# Patient Record
Sex: FEMALE | URBAN - METROPOLITAN AREA
[De-identification: names, ages, dates, MRNs, and addresses within clinical notes are randomized per-mention and may not be internally consistent; named-entity substitution may affect disease eponyms.]

---

## 2017-04-11 ENCOUNTER — APPOINTMENT (RX ONLY)
Dept: URBAN - METROPOLITAN AREA CLINIC 77 | Facility: CLINIC | Age: 57
Setting detail: DERMATOLOGY
End: 2017-04-11

## 2017-04-11 DIAGNOSIS — L64.8 OTHER ANDROGENIC ALOPECIA: ICD-10-CM

## 2017-04-11 DIAGNOSIS — L85.3 XEROSIS CUTIS: ICD-10-CM

## 2017-04-11 PROBLEM — L65.9 NONSCARRING HAIR LOSS, UNSPECIFIED: Status: ACTIVE | Noted: 2017-04-11

## 2017-04-11 PROCEDURE — ? BIOPSY BY PUNCH METHOD

## 2017-04-11 PROCEDURE — 11100: CPT

## 2017-04-11 PROCEDURE — 99203 OFFICE O/P NEW LOW 30 MIN: CPT | Mod: 25

## 2017-04-11 PROCEDURE — ? TREATMENT REGIMEN

## 2017-04-11 PROCEDURE — ? COUNSELING

## 2017-04-11 ASSESSMENT — LOCATION ZONE DERM: LOCATION ZONE: SCALP

## 2017-04-11 ASSESSMENT — LOCATION DETAILED DESCRIPTION DERM: LOCATION DETAILED: LEFT CENTRAL FRONTAL SCALP

## 2017-04-11 ASSESSMENT — LOCATION SIMPLE DESCRIPTION DERM: LOCATION SIMPLE: LEFT SCALP

## 2017-04-11 NOTE — PROCEDURE: BIOPSY BY PUNCH METHOD
Size Of Lesion In Cm (Optional): 0
Post-Care Instructions: I reviewed with the patient in detail post-care instructions. Patient is to keep the biopsy site dry overnight, and then apply bacitracin twice daily until healed. Patient may apply hydrogen peroxide soaks to remove any crusting.
Biopsy Type: H and E
Epidermal Sutures: 5-0 Ethilon
Lab: [id number]
Punch Size In Mm: 3
Patient Will Remove Sutures At Home?: No
Anesthesia Type: 1% lidocaine with epinephrine
Home Suture Removal Text: Patient was provided a home suture removal kit and will remove their sutures at home.  If they have any questions or difficulties they will call the office.
Hemostasis: None
Dressing: bandage
Wound Care: Bacitracin
Suture Removal: 14 days
Lab Facility: [id number]
Notification Instructions: Patient will be notified of biopsy results. However, patient instructed to call the office if not contacted within 2 weeks.
Consent: Written consent was obtained and risks were reviewed including but not limited to scarring, infection, bleeding, scabbing, incomplete removal, nerve damage and allergy to anesthesia.
Body Location Override (Optional - Billing Will Still Be Based On Selected Body Map Location If Applicable): Scalp
Deep Sutures: 5-0 Vicryl
Anesthesia Volume In Cc (Will Not Render If 0): 0.5
Billing Type: Third-Party Bill
Detail Level: Detailed

## 2017-04-11 NOTE — PROCEDURE: TREATMENT REGIMEN
Plan: Pt has diffuse thinning hair on her scalp.\\nPt states that it has gradually been falling out, but recently, more of it has fallen out quicker.\\nPt states that she has been to many doctors and feels like they really have not looked into the problem.\\nPt states that blood work was recently done and found out that she has hypothyroidism,\\nSo currently her only treatment is taking iron supplements, little to no improvement.\\nPt states she is also using Rogaine as well.\\nDiscussed with pt since she has not had a punch bx, will perform punch bx today to get definitive diagnosis.\\nDiscussed with pt that once bx report returns, will treat as needed.\\nF/u in 2 weeks
Detail Level: Zone

## 2017-04-25 ENCOUNTER — APPOINTMENT (RX ONLY)
Dept: URBAN - METROPOLITAN AREA CLINIC 77 | Facility: CLINIC | Age: 57
Setting detail: DERMATOLOGY
End: 2017-04-25

## 2017-04-25 DIAGNOSIS — L64.8 OTHER ANDROGENIC ALOPECIA: ICD-10-CM

## 2017-04-25 DIAGNOSIS — Z48.02 ENCOUNTER FOR REMOVAL OF SUTURES: ICD-10-CM

## 2017-04-25 PROCEDURE — ? PRESCRIPTION

## 2017-04-25 PROCEDURE — ? TREATMENT REGIMEN

## 2017-04-25 PROCEDURE — ? SUTURE REMOVAL (GLOBAL PERIOD)

## 2017-04-25 PROCEDURE — 99213 OFFICE O/P EST LOW 20 MIN: CPT

## 2017-04-25 PROCEDURE — ? COUNSELING

## 2017-04-25 RX ORDER — SPIRONOLACTONE 50 MG/1
TABLET, FILM COATED ORAL
Qty: 50 | Refills: 3 | Status: ERX | COMMUNITY
Start: 2017-04-25

## 2017-04-25 RX ADMIN — SPIRONOLACTONE: 50 TABLET, FILM COATED ORAL at 22:12

## 2017-04-25 ASSESSMENT — LOCATION SIMPLE DESCRIPTION DERM: LOCATION SIMPLE: LEFT SCALP

## 2017-04-25 ASSESSMENT — LOCATION ZONE DERM: LOCATION ZONE: SCALP

## 2017-04-25 ASSESSMENT — LOCATION DETAILED DESCRIPTION DERM: LOCATION DETAILED: LEFT CENTRAL FRONTAL SCALP

## 2017-04-25 NOTE — PROCEDURE: SUTURE REMOVAL (GLOBAL PERIOD)
Add 92036 Cpt? (Important Note: In 2017 The Use Of 72550 Is Being Tracked By Cms To Determine Future Global Period Reimbursement For Global Periods): no
Detail Level: Detailed

## 2017-04-25 NOTE — PROCEDURE: TREATMENT REGIMEN
Plan: Advised Pt to start Rogain 5% daily and spironolactone 50 mg\\nWe will monitor progression of hair growth with low dose spironolactone 50 mg ( Pt has low blood pressure), we plan to increase dosage to 100 mg daily along side with starting finasteride. \\nTHIS LOOKS TO BE A LONG TERM ISSUE THAT WE WANT TO GET UNDER CONTROL WITH HORMONE BLOCKING THROUGH SPIRONOLACTONE AND POSSIBLY FINASTERIDE 1MG DAILY\\nOnce she is stable our plan will be to likely refer to a surgeon capable of hair transplant\\Deidra advised to look into hair transplant surgeon
Detail Level: Zone

## 2017-07-17 ENCOUNTER — APPOINTMENT (RX ONLY)
Dept: URBAN - METROPOLITAN AREA CLINIC 77 | Facility: CLINIC | Age: 57
Setting detail: DERMATOLOGY
End: 2017-07-17

## 2017-07-17 DIAGNOSIS — L57.0 ACTINIC KERATOSIS: ICD-10-CM

## 2017-07-17 DIAGNOSIS — D485 NEOPLASM OF UNCERTAIN BEHAVIOR OF SKIN: ICD-10-CM

## 2017-07-17 DIAGNOSIS — B07.0 PLANTAR WART: ICD-10-CM

## 2017-07-17 DIAGNOSIS — Z80.8 FAMILY HISTORY OF MALIGNANT NEOPLASM OF OTHER ORGANS OR SYSTEMS: ICD-10-CM

## 2017-07-17 DIAGNOSIS — D22 MELANOCYTIC NEVI: ICD-10-CM

## 2017-07-17 PROBLEM — D48.5 NEOPLASM OF UNCERTAIN BEHAVIOR OF SKIN: Status: ACTIVE | Noted: 2017-07-17

## 2017-07-17 PROBLEM — D22.72 MELANOCYTIC NEVI OF LEFT LOWER LIMB, INCLUDING HIP: Status: ACTIVE | Noted: 2017-07-17

## 2017-07-17 PROCEDURE — ? BENIGN DESTRUCTION

## 2017-07-17 PROCEDURE — 11100: CPT | Mod: 59

## 2017-07-17 PROCEDURE — ? BIOPSY BY SHAVE METHOD

## 2017-07-17 PROCEDURE — ? COUNSELING

## 2017-07-17 PROCEDURE — ? OBSERVATION AND MEASURE

## 2017-07-17 PROCEDURE — ? PHOTODYNAMIC THERAPY COUNSELING

## 2017-07-17 PROCEDURE — 17110 DESTRUCTION B9 LES UP TO 14: CPT

## 2017-07-17 PROCEDURE — 99213 OFFICE O/P EST LOW 20 MIN: CPT | Mod: 25

## 2017-07-17 ASSESSMENT — LOCATION ZONE DERM
LOCATION ZONE: LEG
LOCATION ZONE: FACE
LOCATION ZONE: FEET
LOCATION ZONE: ARM

## 2017-07-17 ASSESSMENT — LOCATION DETAILED DESCRIPTION DERM
LOCATION DETAILED: LEFT POSTERIOR SHOULDER
LOCATION DETAILED: LEFT INFERIOR CENTRAL MALAR CHEEK
LOCATION DETAILED: INFERIOR MID FOREHEAD
LOCATION DETAILED: LEFT LATERAL PROXIMAL CALF
LOCATION DETAILED: RIGHT INFERIOR CENTRAL MALAR CHEEK
LOCATION DETAILED: RIGHT PLANTAR FOREFOOT OVERLYING 2ND METATARSAL

## 2017-07-17 ASSESSMENT — LOCATION SIMPLE DESCRIPTION DERM
LOCATION SIMPLE: INFERIOR FOREHEAD
LOCATION SIMPLE: LEFT LOWER LEG
LOCATION SIMPLE: RIGHT PLANTAR SURFACE
LOCATION SIMPLE: RIGHT CHEEK
LOCATION SIMPLE: LEFT SHOULDER
LOCATION SIMPLE: LEFT CHEEK

## 2017-07-17 NOTE — PROCEDURE: BENIGN DESTRUCTION
Medical Necessity Information: It is in your best interest to select a reason for this procedure from the list below. All of these items fulfill various CMS LCD requirements except the new and changing color options.
Treatment Number (Will Not Render If 0): 0
Detail Level: Detailed
Include Z78.9 (Other Specified Conditions Influencing Health Status) As An Associated Diagnosis?: No
Post-Care Instructions: I reviewed with the patient in detail post-care instructions. Patient is to wear sunprotection, and avoid picking at any of the treated lesions. Pt may apply Vaseline to crusted or scabbing areas.
Anesthesia Volume In Cc: 0.5
Medical Necessity Clause: This procedure was medically necessary because the lesions that were treated were:
Consent: The patient's consent was obtained including but not limited to risks of crusting, scabbing, blistering, scarring, darker or lighter pigmentary change, recurrence, incomplete removal and infection.

## 2017-07-17 NOTE — PROCEDURE: OBSERVATION
Detail Level: Detailed
Morphology Per Location (Optional): Brown segmented patch
Size Of Lesion: 6 x 3 in

## 2017-07-17 NOTE — PROCEDURE: BIOPSY BY SHAVE METHOD
Wound Care: Bacitracin
Dressing: bandage
Lab: [id number]
Additional Anesthesia Volume In Cc (Will Not Render If 0): 0
Type Of Destruction Used: Curettage
Destruction After The Procedure: No
Billing Type: Third-Party Bill
Curettage Text: The wound bed was treated with curettage after the biopsy was performed.
Biopsy Type: H and E
Electrodesiccation And Curettage Text: The wound bed was treated with electrodesiccation and curettage after the biopsy was performed.
Biopsy Method: Dermablade
Consent: Written consent was obtained and risks were reviewed including but not limited to scarring, infection, bleeding, scabbing, incomplete removal, nerve damage and allergy to anesthesia.
Silver Nitrate Text: The wound bed was treated with silver nitrate after the biopsy was performed.
Electrodesiccation Text: The wound bed was treated with electrodesiccation after the biopsy was performed.
Cryotherapy Text: The wound bed was treated with cryotherapy after the biopsy was performed.
Lab Facility: [id number]
Notification Instructions: Patient will be notified of biopsy results. However, patient instructed to call the office if not contacted within 2 weeks.
Anesthesia Volume In Cc (Will Not Render If 0): 0.5
Post-Care Instructions: I reviewed with the patient in detail post-care instructions. Patient is to keep the biopsy site dry overnight, and then apply bacitracin twice daily until healed. Patient may apply hydrogen peroxide soaks to remove any crusting.
Hemostasis: Drysol
Detail Level: Detailed
Anesthesia Type: 1% lidocaine with epinephrine

## 2017-10-27 ENCOUNTER — RX ONLY (OUTPATIENT)
Age: 57
Setting detail: RX ONLY
End: 2017-10-27

## 2017-10-27 RX ORDER — SPIRONOLACTONE 50 MG/1
TABLET, FILM COATED ORAL
Qty: 50 | Refills: 11 | Status: ERX

## 2018-11-05 ENCOUNTER — APPOINTMENT (RX ONLY)
Dept: URBAN - METROPOLITAN AREA CLINIC 77 | Facility: CLINIC | Age: 58
Setting detail: DERMATOLOGY
End: 2018-11-05

## 2018-11-05 DIAGNOSIS — L21.8 OTHER SEBORRHEIC DERMATITIS: ICD-10-CM

## 2018-11-05 DIAGNOSIS — L91.8 OTHER HYPERTROPHIC DISORDERS OF THE SKIN: ICD-10-CM

## 2018-11-05 DIAGNOSIS — L64.8 OTHER ANDROGENIC ALOPECIA: ICD-10-CM

## 2018-11-05 DIAGNOSIS — L57.0 ACTINIC KERATOSIS: ICD-10-CM

## 2018-11-05 DIAGNOSIS — D22 MELANOCYTIC NEVI: ICD-10-CM

## 2018-11-05 DIAGNOSIS — M71 OTHER BURSOPATHIES: ICD-10-CM

## 2018-11-05 DIAGNOSIS — Z80.8 FAMILY HISTORY OF MALIGNANT NEOPLASM OF OTHER ORGANS OR SYSTEMS: ICD-10-CM

## 2018-11-05 DIAGNOSIS — B07.8 OTHER VIRAL WARTS: ICD-10-CM

## 2018-11-05 DIAGNOSIS — L82.0 INFLAMED SEBORRHEIC KERATOSIS: ICD-10-CM

## 2018-11-05 PROBLEM — D22.72 MELANOCYTIC NEVI OF LEFT LOWER LIMB, INCLUDING HIP: Status: ACTIVE | Noted: 2018-11-05

## 2018-11-05 PROBLEM — M71.341 OTHER BURSAL CYST, RIGHT HAND: Status: ACTIVE | Noted: 2018-11-05

## 2018-11-05 PROCEDURE — 17000 DESTRUCT PREMALG LESION: CPT | Mod: 59

## 2018-11-05 PROCEDURE — ? PRESCRIPTION

## 2018-11-05 PROCEDURE — 17110 DESTRUCTION B9 LES UP TO 14: CPT

## 2018-11-05 PROCEDURE — ? OBSERVATION AND MEASURE

## 2018-11-05 PROCEDURE — ? INTRALESIONAL KENALOG

## 2018-11-05 PROCEDURE — 99213 OFFICE O/P EST LOW 20 MIN: CPT | Mod: 25

## 2018-11-05 PROCEDURE — ? LIQUID NITROGEN

## 2018-11-05 PROCEDURE — ? BENIGN DESTRUCTION

## 2018-11-05 PROCEDURE — ? PHOTODYNAMIC THERAPY COUNSELING

## 2018-11-05 PROCEDURE — ? COUNSELING

## 2018-11-05 PROCEDURE — ? TREATMENT REGIMEN

## 2018-11-05 RX ORDER — CLOBETASOL PROPIONATE 0.5 MG/ML
SHAMPOO TOPICAL
Qty: 1 | Refills: 6 | Status: ERX | COMMUNITY
Start: 2018-11-05

## 2018-11-05 RX ADMIN — CLOBETASOL PROPIONATE: 0.5 SHAMPOO TOPICAL at 16:19

## 2018-11-05 ASSESSMENT — LOCATION SIMPLE DESCRIPTION DERM
LOCATION SIMPLE: RIGHT EAR
LOCATION SIMPLE: RIGHT INDEX FINGER
LOCATION SIMPLE: RIGHT PLANTAR SURFACE
LOCATION SIMPLE: INFERIOR FOREHEAD
LOCATION SIMPLE: RIGHT CHEEK
LOCATION SIMPLE: LEFT LOWER LEG
LOCATION SIMPLE: LEFT UPPER ARM
LOCATION SIMPLE: NOSE
LOCATION SIMPLE: RIGHT HAND
LOCATION SIMPLE: LEFT CHEEK

## 2018-11-05 ASSESSMENT — LOCATION DETAILED DESCRIPTION DERM
LOCATION DETAILED: RIGHT CYMBA CONCHA
LOCATION DETAILED: LEFT ANTERIOR PROXIMAL UPPER ARM
LOCATION DETAILED: LEFT INFERIOR CENTRAL MALAR CHEEK
LOCATION DETAILED: RIGHT INFERIOR CENTRAL MALAR CHEEK
LOCATION DETAILED: RIGHT RADIAL DORSAL HAND
LOCATION DETAILED: NASAL DORSUM
LOCATION DETAILED: RIGHT PLANTAR FOREFOOT OVERLYING 2ND METATARSAL
LOCATION DETAILED: LEFT LATERAL PROXIMAL CALF
LOCATION DETAILED: RIGHT DORSAL MIDDLE FINGER METACARPOPHALANGEAL JOINT
LOCATION DETAILED: INFERIOR MID FOREHEAD
LOCATION DETAILED: RIGHT DISTAL DORSAL INDEX FINGER

## 2018-11-05 ASSESSMENT — LOCATION ZONE DERM
LOCATION ZONE: FACE
LOCATION ZONE: FINGER
LOCATION ZONE: NOSE
LOCATION ZONE: EAR
LOCATION ZONE: HAND
LOCATION ZONE: LEG
LOCATION ZONE: FEET
LOCATION ZONE: ARM

## 2018-11-05 NOTE — PROCEDURE: OBSERVATION
Detail Level: Detailed
Size Of Lesion: 6 x 3 in
Morphology Per Location (Optional): Brown segmented patch

## 2018-11-05 NOTE — PROCEDURE: INTRALESIONAL KENALOG
Consent: The risks of atrophy were reviewed with the patient.
Kenalog Preparation: Kenalog with 5-fluorouracil
Include Z78.9 (Other Specified Conditions Influencing Health Status) As An Associated Diagnosis?: No
Detail Level: Zone
Total Volume Injected (Ccs- Only Use Numbers And Decimals): .8
X Size Of Lesion In Cm (Optional): 0
Medical Necessity Clause: This procedure was medically necessary because the lesions that were treated were:
Concentration Of Solution Injected (Mg/Ml): 40.0

## 2018-11-05 NOTE — PROCEDURE: BENIGN DESTRUCTION
Anesthesia Volume In Cc: 0.5
Include Z78.9 (Other Specified Conditions Influencing Health Status) As An Associated Diagnosis?: No
Detail Level: Detailed
Medical Necessity Clause: This procedure was medically necessary because the lesions that were treated were:
Medical Necessity Information: It is in your best interest to select a reason for this procedure from the list below. All of these items fulfill various CMS LCD requirements except the new and changing color options.
Consent: The patient's consent was obtained including but not limited to risks of crusting, scabbing, blistering, scarring, darker or lighter pigmentary change, recurrence, incomplete removal and infection.
Post-Care Instructions: I reviewed with the patient in detail post-care instructions. Patient is to wear sunprotection, and avoid picking at any of the treated lesions. Pt may apply Vaseline to crusted or scabbing areas.

## 2018-11-05 NOTE — PROCEDURE: LIQUID NITROGEN
Number Of Freeze-Thaw Cycles: 3 freeze-thaw cycles
Medical Necessity Information: It is in your best interest to select a reason for this procedure from the list below. All of these items fulfill various CMS LCD requirements except the new and changing color options.
Consent: The patient's consent was obtained including but not limited to risks of crusting, scabbing, blistering, scarring, darker or lighter pigmentary change, recurrence, incomplete removal and infection.
Medical Necessity Clause: This procedure was medically necessary because the lesions that were treated were:
Post-Care Instructions: I reviewed with the patient in detail post-care instructions. Patient is to wear sunprotection, and avoid picking at any of the treated lesions. Pt may apply Vaseline to crusted or scabbing areas.
Include Z78.9 (Other Specified Conditions Influencing Health Status) As An Associated Diagnosis?: No
Detail Level: Detailed
Number Of Freeze-Thaw Cycles: 2 freeze-thaw cycles
Duration Of Freeze Thaw-Cycle (Seconds): 5
Detail Level: Generalized

## 2018-11-05 NOTE — PROCEDURE: TREATMENT REGIMEN
Detail Level: Zone
Plan: Advised Pt to start Rogain 5% daily and spironolactone 50 mg\\nWe will monitor progression of hair growth with low dose spironolactone 50 mg ( Pt has low blood pressure), we plan to increase dosage to 100 mg daily along side with starting finasteride. \\nTHIS LOOKS TO BE A LONG TERM ISSUE THAT WE WANT TO GET UNDER CONTROL WITH HORMONE BLOCKING THROUGH SPIRONOLACTONE AND POSSIBLY FINASTERIDE 1MG DAILY\\nOnce she is stable our plan will be to likely refer to a surgeon capable of hair transplant\\Deidra advised to look into hair transplant surgeon
Plan: Location: right foot\\nTreatment: LN2-1x\\n\\nDiscussed with patient that they are a common HPV viral infection that occurs during stress and immune system is unable to fight the virus.\\nWill treat with cryotherapy today to allow immune system to fight off virus. \\nDiscussed with pt if wart blister ups, then can use sterile needle to poke and let fluid drain.\\nDiscussed with pt that they should leave skin attached to help provide a protective barrier while it is healing.\\n\\nDiscussed with pt if it does not disappear, then we my have to treat with Cidofovir anti viral injection at future appointment.\\nAlthough it is not FDA approved, is very effective. \\nDiscussed anticipated side effects of Cidofovir treatment that may include discomfort, burning, swelling, redness, and blistering.\\nF/u in 4 weeks
Plan: Advised pt to do PDT for face in the Fall
Detail Level: Generalized
Plan: Location:\\nPrescribe: AVAR Wash (3-5 minutes) qd x 30 days\\n                Elidel Cream top qd x 30 days\\n\\nDiscussed with patient that this irritation, dry scaly rash is an immune mediated condition that can be triggered or exacerbated by several factors such as lack of sleep, stress, allergies, or illness. \\nThere is also a genetic component which we discussed can be helped by taking good care of the skin with a barrier cream and avoiding harsh products.\\n\\nWill prescribe AVAR wash LS Cleanser (5 minutes), Elidel cream x 30 days to help relieve inflammation.\\nRecommend taking Allegra 180mg daily to decrease histamine level and eliminate this trigger.\\nF/u as needed.

## 2018-11-07 ENCOUNTER — RX ONLY (OUTPATIENT)
Age: 58
Setting detail: RX ONLY
End: 2018-11-07

## 2018-11-07 RX ORDER — CLOBETASOL PROPIONATE 0.5 MG/ML
SHAMPOO TOPICAL
Qty: 1 | Refills: 6 | Status: ERX

## 2020-01-07 ENCOUNTER — RX ONLY (OUTPATIENT)
Age: 60
Setting detail: RX ONLY
End: 2020-01-07

## 2020-02-03 ENCOUNTER — APPOINTMENT (RX ONLY)
Dept: URBAN - METROPOLITAN AREA CLINIC 77 | Facility: CLINIC | Age: 60
Setting detail: DERMATOLOGY
End: 2020-02-03

## 2020-02-03 DIAGNOSIS — L64.8 OTHER ANDROGENIC ALOPECIA: ICD-10-CM

## 2020-02-03 DIAGNOSIS — L72.0 EPIDERMAL CYST: ICD-10-CM

## 2020-02-03 DIAGNOSIS — B07.8 OTHER VIRAL WARTS: ICD-10-CM

## 2020-02-03 DIAGNOSIS — L85.3 XEROSIS CUTIS: ICD-10-CM

## 2020-02-03 PROCEDURE — 99213 OFFICE O/P EST LOW 20 MIN: CPT | Mod: 25

## 2020-02-03 PROCEDURE — ? PRESCRIPTION

## 2020-02-03 PROCEDURE — ? COUNSELING

## 2020-02-03 PROCEDURE — 11900 INJECT SKIN LESIONS </W 7: CPT

## 2020-02-03 PROCEDURE — ? TREATMENT REGIMEN

## 2020-02-03 PROCEDURE — ? INJECTION

## 2020-02-03 ASSESSMENT — LOCATION SIMPLE DESCRIPTION DERM
LOCATION SIMPLE: RIGHT PLANTAR SURFACE
LOCATION SIMPLE: LEFT HAND
LOCATION SIMPLE: RIGHT HAND
LOCATION SIMPLE: RIGHT CHEEK

## 2020-02-03 ASSESSMENT — LOCATION ZONE DERM
LOCATION ZONE: HAND
LOCATION ZONE: FEET
LOCATION ZONE: FACE

## 2020-02-03 ASSESSMENT — LOCATION DETAILED DESCRIPTION DERM
LOCATION DETAILED: RIGHT PLANTAR FOREFOOT OVERLYING 4TH METATARSAL
LOCATION DETAILED: RIGHT MEDIAL MALAR CHEEK
LOCATION DETAILED: RIGHT SUPERIOR MEDIAL MALAR CHEEK
LOCATION DETAILED: RIGHT DORSAL MIDDLE METACARPOPHALANGEAL JOINT
LOCATION DETAILED: LEFT ULNAR DORSAL HAND

## 2020-02-03 NOTE — PROCEDURE: TREATMENT REGIMEN
Detail Level: Zone
Plan: Location: Scalp\\nPh[facility]acy: [facility] [address] \\nTaking: Spironolactone 100mg tablet \\nNEW PRESCRIPTION: Finasteride 5mg tablet \\n\\n2-3-2020 \\n\\nPatient is here for a follow up \\nPatient states she is currently taking Spironolactone once a day with no side effects \\nPatient is also using Men’s Rogaine, she mentions she did notice some hair loss recently but could have been related to her being under some stress due to work \\n\\nDiscussed with patient that androgenetic alopecia is a condition triggered by stress but also has a genetic component \\nPatient states she is comfortable on treatment regimen however if there is something else she can add on to her treatment regimen to help maintain her hair she would like to try it \\n\\nPLAN: \\nToday i will have patient continue on treatment regimen she is currently on --- taking spironolactone once a day and using men's rogaine \\nI will be adding Finasteride 5mg patient is to take 1/2 tablet daily discussed with patient possible side effects if any she would probably experience low sex drive however are soon as she stops medication that will resolve \\n--- Discussed with patient that if she begins to notice any side effects other than low sex drive she is to call office and inform staff to adjust medication \\n\\nFollow up: 5-6 months
Plan: Location: Right cheek \\n\\nPatient presents with a milium located on the right cheek \\nPatient states she finds lesion bothersome and wants it gone \\n\\nDiscussed with patient that lesion is completely benign \\nToday I will remove lesion with an 11 blade and cotton tip applicators
Plan: Location: Right plantar foot \\nTreatment: Cidofovir injection \\n\\nPatient presents with a wart located on the right plantar foot \\nLesion has been treated in the past with liquid nitrogen \\nPatient states every time after each treatment wart will only pulse but never resolve \\n\\nDiscussed with patient that they are a common HPV viral infection that occurs during stress and immune system is unable to fight the virus.\\n— Discussed with patient that since wart has not responded well to liquid nitrogen we will treat with Cidofovir today \\n\\nWHEN TREATING WITH CIDOFOVIR, I PERFORM 20-50 MICROINJECTIONS OF THE ANTIVIRAL MEDICATION INTO THE WART ONCE I HAVE PARED IT DOWN-----THIS MEDICATION THEN KILLS THE VIRALLY ACTIVE CELLS AND THE IMMUNE SYSTEM IS BROUGHT TO THE SURFACE TO CLEAR THE WART AND THE DESTROYED DEBRIS.\\nTHIS MEDICATION WORKS ON RESILIANT WARTS THAT I HAVE NOT BEEN ABLE TO CLEAR OTHERWISE\\n— If patient finds herself with discomfort then she is to call office and schedule appointment \\n\\n\\nFollow up: 5-6 weeks

## 2020-02-03 NOTE — PROCEDURE: INJECTION
Dose Administered (Numbers Only): 0
Medication (1) And Associated J-Code Units: Cidofovir, 375 mg
Hide Second Medication?: No
Post-Care Instructions: I reviewed with the patient in detail post-care instructions. Patient understands to keep the injection sites clean and call the clinic if there is any redness, swelling or pain.
Detail Level: None
Total Volume Injected In Cc (Will Not Affected Billing): 0.2
Route: IL
Render J-Code Information In Note?: yes
Procedure Information: Please note that the numeric value listed in the Medication (1) and associated J-code units and Medication (2) and associated J-code units variables are j-code amounts and do not represent either the concentration or the total amount of the medications injected.  I strongly recommend selecting no to the Render J-code information in note question. This will allow your note to be more clear. If you are billing j-codes with your injection codes you need to document the total amount of the medication injected. This amount should match the j-code units. For example, if you are injecting Triamcinolone 40mg as an intramuscular injection you would select 40 for the dose field and mg for the units. This would allow you to document  with 4 units (40mg = 10mg x 4). The total volume is not used to calculate j-codes only the amount of the medication administered.
Administered By (Optional): Dr. Subramanian
Consent: The risks of the medication were reviewed with the patient.

## 2020-02-27 ENCOUNTER — RX ONLY (OUTPATIENT)
Age: 60
Setting detail: RX ONLY
End: 2020-02-27

## 2020-03-09 ENCOUNTER — APPOINTMENT (RX ONLY)
Dept: URBAN - METROPOLITAN AREA CLINIC 77 | Facility: CLINIC | Age: 60
Setting detail: DERMATOLOGY
End: 2020-03-09

## 2020-03-09 DIAGNOSIS — L85.3 XEROSIS CUTIS: ICD-10-CM

## 2020-03-09 DIAGNOSIS — B07.8 OTHER VIRAL WARTS: ICD-10-CM

## 2020-03-09 DIAGNOSIS — B35.1 TINEA UNGUIUM: ICD-10-CM

## 2020-03-09 PROCEDURE — ? LIQUID NITROGEN

## 2020-03-09 PROCEDURE — ? TREATMENT REGIMEN

## 2020-03-09 PROCEDURE — 99213 OFFICE O/P EST LOW 20 MIN: CPT | Mod: 25

## 2020-03-09 PROCEDURE — ? COUNSELING

## 2020-03-09 PROCEDURE — ? PRESCRIPTION

## 2020-03-09 PROCEDURE — 17110 DESTRUCTION B9 LES UP TO 14: CPT

## 2020-03-09 RX ORDER — TAVABOROLE 43.5 MG/ML
SOLUTION TOPICAL
Qty: 1 | Refills: 3 | Status: ERX | COMMUNITY
Start: 2020-03-09

## 2020-03-09 RX ORDER — FLUCONAZOLE 100 MG/1
TABLET ORAL
Qty: 36 | Refills: 0 | Status: ERX | COMMUNITY
Start: 2020-03-09

## 2020-03-09 RX ADMIN — TAVABOROLE: 43.5 SOLUTION TOPICAL at 00:00

## 2020-03-09 RX ADMIN — FLUCONAZOLE: 100 TABLET ORAL at 00:00

## 2020-03-09 ASSESSMENT — LOCATION SIMPLE DESCRIPTION DERM
LOCATION SIMPLE: UPPER BACK
LOCATION SIMPLE: RIGHT PLANTAR SURFACE

## 2020-03-09 ASSESSMENT — LOCATION ZONE DERM
LOCATION ZONE: TRUNK
LOCATION ZONE: FEET

## 2020-03-09 ASSESSMENT — LOCATION DETAILED DESCRIPTION DERM
LOCATION DETAILED: RIGHT PLANTAR FOREFOOT OVERLYING 1ST METATARSAL
LOCATION DETAILED: SUPERIOR THORACIC SPINE
LOCATION DETAILED: RIGHT PLANTAR FOREFOOT OVERLYING 4TH METATARSAL

## 2020-03-09 NOTE — PROCEDURE: TREATMENT REGIMEN
Detail Level: Zone
Plan: Location: Right plantar foot \\nPrevious treatment: Cidofovir injection 02/03/2020\\nTreatment: LN2 03/09/2020\\n\\nPatient is here for a 4 week follow up\\nPnataly has previously gotten a Cidofovir injection on 02/03/2020 and she states injection did help to eliminate plantar wart, however wart has not fully gone away.\\nToday I will treat with LN2 treatment and see how well the patient responds since getting Cidofovir injection. \\nPatient is to monitor plantar wart and follow up if more treatments are required.\\n\\nPatient is to follow up as needed. \\n-------------------------------------------------------------------------------\\nPatient presents with a wart located on the right plantar foot \\nLesion has been treated in the past with liquid nitrogen \\nPatient states every time after each treatment wart will only pulse but never resolve \\n\\nDiscussed with patient that they are a common HPV viral infection that occurs during stress and immune system is unable to fight the virus.\\n— Discussed with patient that since wart has not responded well to liquid nitrogen we will treat with Cidofovir today \\n\\nWHEN TREATING WITH CIDOFOVIR, I PERFORM 20-50 MICROINJECTIONS OF THE ANTIVIRAL MEDICATION INTO THE WART ONCE I HAVE PARED IT DOWN-----THIS MEDICATION THEN KILLS THE VIRALLY ACTIVE CELLS AND THE IMMUNE SYSTEM IS BROUGHT TO THE SURFACE TO CLEAR THE WART AND THE DESTROYED DEBRIS.\\nTHIS MEDICATION WORKS ON RESILIANT WARTS THAT I HAVE NOT BEEN ABLE TO CLEAR OTHERWISE\\n— If patient finds herself with discomfort then she is to call office and schedule appointment \\n\\n\\nFollow up: 5-6 weeks
Plan: Location: Feet\\nPrescribe: Fluconazole 100mg (Take 3 tablets once a day, weekly for 3 months)\\n[facility] \\n\\nDiscussed with patient that he has severe fungal infection.\\nEducated patient that fungal infection can grow after having trauma, humid environment, or when theres an opening in the nail bed\\n\\nDiscussed with pt that we will prescribe Fluconazole 100mg (Take 3 tablets once a day, weekly for 3 months) --pulse dosing-- to help fight fungal infection.\\nDiscussed with patient that nail bed typically takes 9-12 months to completely grow out fungal infection.\\nOnce patient completes oral treatment, we will start patient on topical treatment such as Kerydin. \\nPatient is to apply Kerydin daily to help stimulate the nail growth.\\nPatient is to continue current regimen and follow up as instructed.\\n\\n\\nPatient is to follow up in 3 months

## 2020-05-20 ENCOUNTER — RX ONLY (OUTPATIENT)
Age: 60
Setting detail: RX ONLY
End: 2020-05-20

## 2020-06-08 ENCOUNTER — APPOINTMENT (RX ONLY)
Dept: URBAN - METROPOLITAN AREA CLINIC 77 | Facility: CLINIC | Age: 60
Setting detail: DERMATOLOGY
End: 2020-06-08

## 2020-06-08 DIAGNOSIS — L21.8 OTHER SEBORRHEIC DERMATITIS: ICD-10-CM

## 2020-06-08 DIAGNOSIS — B07.8 OTHER VIRAL WARTS: ICD-10-CM

## 2020-06-08 DIAGNOSIS — L85.3 XEROSIS CUTIS: ICD-10-CM

## 2020-06-08 DIAGNOSIS — B35.1 TINEA UNGUIUM: ICD-10-CM

## 2020-06-08 DIAGNOSIS — L84 CORNS AND CALLOSITIES: ICD-10-CM

## 2020-06-08 PROCEDURE — ? TREATMENT REGIMEN

## 2020-06-08 PROCEDURE — ? PRESCRIPTION

## 2020-06-08 PROCEDURE — 99213 OFFICE O/P EST LOW 20 MIN: CPT | Mod: 25

## 2020-06-08 PROCEDURE — ? COUNSELING

## 2020-06-08 PROCEDURE — ? LIQUID NITROGEN

## 2020-06-08 PROCEDURE — 17110 DESTRUCTION B9 LES UP TO 14: CPT

## 2020-06-08 RX ORDER — UREA 400 MG/G
CREAM TOPICAL
Qty: 1 | Refills: 2 | Status: ERX | COMMUNITY
Start: 2020-06-08

## 2020-06-08 RX ORDER — CLOBETASOL PROPIONATE 0.5 MG/ML
SOLUTION TOPICAL BID
Qty: 1 | Refills: 4 | Status: ERX | COMMUNITY
Start: 2020-06-08

## 2020-06-08 RX ORDER — TAVABOROLE 43.5 MG/ML
SOLUTION TOPICAL
Qty: 1 | Refills: 3 | Status: ERX

## 2020-06-08 RX ADMIN — UREA: 400 CREAM TOPICAL at 00:00

## 2020-06-08 RX ADMIN — CLOBETASOL PROPIONATE: 0.5 SOLUTION TOPICAL at 00:00

## 2020-06-08 ASSESSMENT — LOCATION SIMPLE DESCRIPTION DERM
LOCATION SIMPLE: RIGHT PLANTAR SURFACE
LOCATION SIMPLE: UPPER BACK

## 2020-06-08 ASSESSMENT — LOCATION ZONE DERM
LOCATION ZONE: TRUNK
LOCATION ZONE: FEET

## 2020-06-08 NOTE — PROCEDURE: LIQUID NITROGEN
Medical Necessity Clause: This procedure was medically necessary because the lesions that were treated were:
Medical Necessity Information: It is in your best interest to select a reason for this procedure from the list below. All of these items fulfill various CMS LCD requirements except the new and changing color options.
Render Note In Bullet Format When Appropriate: No
Post-Care Instructions: I reviewed with the patient in detail post-care instructions. Patient is to wear sunprotection, and avoid picking at any of the treated lesions. Pt may apply Vaseline to crusted or scabbing areas.
Consent: The patient's consent was obtained including but not limited to risks of crusting, scabbing, blistering, scarring, darker or lighter pigmentary change, recurrence, incomplete removal and infection.
Number Of Freeze-Thaw Cycles: 3 freeze-thaw cycles
Detail Level: Detailed

## 2020-06-08 NOTE — PROCEDURE: TREATMENT REGIMEN
Plan: Location: Right plantar foot \\nPrevious treatment: Cidofovir injection 02/03/2020\\nTreatment: LN2 03/09/2020\\n\\nPatient is here for a follow up after receiving Cidofovir injection on 02/03/2020\\nShe states she noticed significant improvement since getting injection\\nToday upon examination it does appear there are sings of wart still present, will treat with cryothearpy to better assist in eliminating wart.\\nPatient is to monitor plantar wart and follow up if more treatments are required.\\n\\n\\nPatient is to follow up as needed  \\n-----------------------------------------------------------\\nPatient is here for a 4 week follow up\\nPnataly has previously gotten a Cidofovir injection on 02/03/2020 and she states injection did help to eliminate plantar wart, however wart has not fully gone away.\\nToday I will treat with LN2 treatment and see how well the patient responds since getting Cidofovir injection. \\nPatient is to monitor plantar wart and follow up if more treatments are required.\\n\\nPatient is to follow up as needed. \\n-------------------------------------------------------------------------------\\nPatient presents with a wart located on the right plantar foot \\nLesion has been treated in the past with liquid nitrogen \\nPatient states every time after each treatment wart will only pulse but never resolve \\n\\nDiscussed with patient that they are a common HPV viral infection that occurs during stress and immune system is unable to fight the virus.\\n— Discussed with patient that since wart has not responded well to liquid nitrogen we will treat with Cidofovir today \\n\\nWHEN TREATING WITH CIDOFOVIR, I PERFORM 20-50 MICROINJECTIONS OF THE ANTIVIRAL MEDICATION INTO THE WART ONCE I HAVE PARED IT DOWN-----THIS MEDICATION THEN KILLS THE VIRALLY ACTIVE CELLS AND THE IMMUNE SYSTEM IS BROUGHT TO THE SURFACE TO CLEAR THE WART AND THE DESTROYED DEBRIS.\\nTHIS MEDICATION WORKS ON RESILIANT WARTS THAT I HAVE NOT BEEN ABLE TO CLEAR OTHERWISE\\n— If patient finds herself with discomfort then she is to call office and schedule appointment \\n\\n\\nFollow up: 5-6 weeks
Detail Level: Zone
Plan: Location: Feet\\nPreviously prescribed: Fluconazole 100mg (Take 3 tablets once a day, weekly for 3 months)\\n[facility] \\n\\n06/08/2020\\n\\nPatient is here for a follow up \\nPatient states she has been applying Kerydin daily and has noticed a significant improvement\\nShe states she has already completed taking oral Fluconazole, I will not send out another prescription for Fluconazole at today's visit.\\nPatient is to continue current regimen that consist of applying Kerydin daily and follow up as needed\\n\\n\\nPatient is to follow up as needed. \\n------------------------------------------------------------------------\\nDiscussed with patient that he has severe fungal infection.\\nEducated patient that fungal infection can grow after having trauma, humid environment, or when theres an opening in the nail bed\\n\\nDiscussed with pt that we will prescribe Fluconazole 100mg (Take 3 tablets once a day, weekly for 3 months) --pulse dosing-- to help fight fungal infection.\\nDiscussed with patient that nail bed typically takes 9-12 months to completely grow out fungal infection.\\nOnce patient completes oral treatment, we will start patient on topical treatment such as Kerydin. \\nPatient is to apply Kerydin daily to help stimulate the nail growth.\\nPatient is to continue current regimen and follow up as instructed.\\n\\n\\nPatient is to follow up in 3 months
Plan: Location:ears\\nPrescribe: clobetasol 0.05 % scalp solution Scalp Frequency: Bid Sig: Apply to affected areas daily as needed for flare ups. Avoid face.\\nPharmacy: [facility] \\n\\nDiscussed with patient that this irritation, dry scaly rash is an immune mediated condition that can be triggered or exacerbated by several factors such as lack of sleep, stress, allergies, or illness. \\nThere is also a genetic component which we discussed can be helped by taking good care of the skin with a barrier cream and avoiding harsh products.\\n\\nWill prescribe Clobetasol topical solution to help relieve inflammation.\\nRecommend taking Allegra 180mg daily to decrease histamine level and eliminate this trigger.\\nF/u as needed
Plan: Location: right plantar\\nPrescribed: urea 40 % topical cream (Apply to thickened skin after bathing- QD to BID)\\nPharmacy: [facility] \\n\\nA callus is a thickened area of skin on the foot caused by pressure and repeated rubbing, such as from a shoe or sock. The rubbing causes the skin to produce a layer of protective skin (a callus). Calluses vary in size and can become painful.\\n\\nThere are a number of treatments for painful calluses. People who have calluses are cautioned against performing “bathroom surgery,” as this can lead to cuts and infection. A foot and ankle surgeon can evaluate the cause of the calluses and recommend the treatment most appropriate for your condition. However, if the underlying cause of the callus is not treated or removed, the callus may return.\\n\\nWill prescribe Urea 40% to be applied daily as needed \\n

## 2020-12-17 ENCOUNTER — RX ONLY (OUTPATIENT)
Age: 60
Setting detail: RX ONLY
End: 2020-12-17

## 2020-12-17 RX ORDER — FINASTERIDE 5 MG/1
TABLET, FILM COATED ORAL
Qty: 30 | Refills: 5 | Status: ERX

## 2021-06-07 ENCOUNTER — APPOINTMENT (RX ONLY)
Dept: URBAN - METROPOLITAN AREA CLINIC 77 | Facility: CLINIC | Age: 61
Setting detail: DERMATOLOGY
End: 2021-06-07

## 2021-06-07 DIAGNOSIS — B07.8 OTHER VIRAL WARTS: ICD-10-CM

## 2021-06-07 DIAGNOSIS — D18.0 HEMANGIOMA: ICD-10-CM

## 2021-06-07 DIAGNOSIS — D22 MELANOCYTIC NEVI: ICD-10-CM

## 2021-06-07 DIAGNOSIS — L64.8 OTHER ANDROGENIC ALOPECIA: ICD-10-CM

## 2021-06-07 DIAGNOSIS — Z80.8 FAMILY HISTORY OF MALIGNANT NEOPLASM OF OTHER ORGANS OR SYSTEMS: ICD-10-CM

## 2021-06-07 PROBLEM — D18.01 HEMANGIOMA OF SKIN AND SUBCUTANEOUS TISSUE: Status: ACTIVE | Noted: 2021-06-07

## 2021-06-07 PROBLEM — D22.72 MELANOCYTIC NEVI OF LEFT LOWER LIMB, INCLUDING HIP: Status: ACTIVE | Noted: 2021-06-07

## 2021-06-07 PROCEDURE — 99213 OFFICE O/P EST LOW 20 MIN: CPT | Mod: 25

## 2021-06-07 PROCEDURE — ? TREATMENT REGIMEN

## 2021-06-07 PROCEDURE — ? OBSERVATION AND MEASURE

## 2021-06-07 PROCEDURE — ? PRESCRIPTION

## 2021-06-07 PROCEDURE — 17110 DESTRUCTION B9 LES UP TO 14: CPT

## 2021-06-07 PROCEDURE — ? LIQUID NITROGEN

## 2021-06-07 PROCEDURE — ? COUNSELING

## 2021-06-07 PROCEDURE — ? INJECTION

## 2021-06-07 ASSESSMENT — LOCATION ZONE DERM
LOCATION ZONE: FEET
LOCATION ZONE: TRUNK
LOCATION ZONE: LEG

## 2021-06-07 ASSESSMENT — LOCATION DETAILED DESCRIPTION DERM
LOCATION DETAILED: LEFT SUPERIOR UPPER BACK
LOCATION DETAILED: RIGHT PLANTAR FOREFOOT OVERLYING 1ST METATARSAL
LOCATION DETAILED: LEFT LATERAL PROXIMAL CALF

## 2021-06-07 ASSESSMENT — LOCATION SIMPLE DESCRIPTION DERM
LOCATION SIMPLE: RIGHT PLANTAR SURFACE
LOCATION SIMPLE: LEFT UPPER BACK
LOCATION SIMPLE: LEFT LOWER LEG

## 2021-06-07 NOTE — PROCEDURE: INJECTION
Detail Level: None
Post-Care Instructions: I reviewed with the patient in detail post-care instructions. Patient understands to keep the injection sites clean and call the clinic if there is any redness, swelling or pain.
Bill J-Code: yes
Dose Administered (Numbers Only): 0
Hide Second Medication?: No
Route: IL
Consent: The risks of the medication were reviewed with the patient.
Medication (1) And Associated J-Code Units: Cidofovir, 375 mg
Procedure Information: Please note that the numeric value listed in the Medication (1) and associated J-code units and Medication (2) and associated J-code units variables are j-code amounts and do not represent either the concentration or the total amount of the medications injected.  I strongly recommend selecting no to the Render J-code information in note question. This will allow your note to be more clear. If you are billing j-codes with your injection codes you need to document the total amount of the medication injected. This amount should match the j-code units. For example, if you are injecting Triamcinolone 40mg as an intramuscular injection you would select 40 for the dose field and mg for the units. This would allow you to document  with 4 units (40mg = 10mg x 4). The total volume is not used to calculate j-codes only the amount of the medication administered.

## 2021-06-07 NOTE — PROCEDURE: LIQUID NITROGEN
Consent: The patient's consent was obtained including but not limited to risks of crusting, scabbing, blistering, scarring, darker or lighter pigmentary change, recurrence, incomplete removal and infection.
Include Z78.9 (Other Specified Conditions Influencing Health Status) As An Associated Diagnosis?: No
Post-Care Instructions: I reviewed with the patient in detail post-care instructions. Patient is to wear sunprotection, and avoid picking at any of the treated lesions. Pt may apply Vaseline to crusted or scabbing areas.
Number Of Freeze-Thaw Cycles: 3 freeze-thaw cycles
Detail Level: Detailed
Medical Necessity Clause: This procedure was medically necessary because the lesions that were treated were:
Medical Necessity Information: It is in your best interest to select a reason for this procedure from the list below. All of these items fulfill various CMS LCD requirements except the new and changing color options.

## 2021-06-07 NOTE — PROCEDURE: TREATMENT REGIMEN
Detail Level: Zone
Plan: Location: Right Plantar \\nTreatment: LN2, Paired with a 15 blade and Cidofovir injection \\n\\nDiscussed with patient that they are a common HPV viral infection that occurs during stress and immune system is unable to fight the virus.\\nWill treat with cryotherapy today to allow immune system to fight off virus. \\nDiscussed with patient if wart blister ups, then can use sterile needle to poke and let fluid drain.\\nDiscussed with patient that they should leave skin attached to help provide a protective barrier while it is healing.\\n\\nDiscussed with patient if it does not disappear, then we my have to treat with Cidofovir anti viral injection at future appointment.\\nAlthough it is not FDA approved, is very effective. \\nDiscussed anticipated side effects of Cidofovir treatment that may include discomfort, burning, swelling, redness, and blistering.\\nWHEN TREATING WITH CIDOFOVIR, I PERFORM 20-50 MICROINJECTIONS OF THE ANTIVIRAL MEDICATION INTO THE WART ONCE I HAVE PARED IT DOWN-----THIS MEDICATION THEN KILLS THE VIRALLY ACTIVE CELLS AND THE IMMUNE SYSTEM IS BROUGHT TO THE SURFACE TO CLEAR THE WART AND THE DESTROYED DEBRIS.\\nTHIS MEDICATION WORKS ON RESILIANT WARTS THAT I HAVE NOT BEEN ABLE TO CLEAR OTHERWISE.
Plan: Location: Scalp\\nPrescribe: compounding medication \\n\\nPatient is here for hair loss.\\nPatient discussed that it has been gradually falling out.\\nHad a long discussion with patient about male/female pattern baldness and the best treatment options to prevent further hair loss. \\nEducated patient on side effects/risks from medication and what to expect from taking the oral medication.\\nRecommend using Rogaine to help produce hair growth from the external as well---discussed hair may fall out for the first few weeks. \\n\\nPLAN: \\n\\n1. Patient is to apply the compounding medication daily directly to scalp 1-2 a day \\n\\n\\nPatient is to follow up as needed

## 2021-06-08 RX ORDER — PHARMACY COMPOUNDING ACCESSORY
EACH MISCELLANEOUS
Qty: 1 | Refills: 6 | Status: ERX | COMMUNITY
Start: 2021-06-08

## 2021-06-08 RX ADMIN — Medication: at 00:00

## 2021-08-18 ENCOUNTER — RX ONLY (OUTPATIENT)
Age: 61
Setting detail: RX ONLY
End: 2021-08-18

## 2021-08-18 RX ORDER — SPIRONOLACTONE 50 MG/1
TABLET, FILM COATED ORAL
Qty: 90 | Refills: 3 | Status: ERX